# Patient Record
Sex: FEMALE
[De-identification: names, ages, dates, MRNs, and addresses within clinical notes are randomized per-mention and may not be internally consistent; named-entity substitution may affect disease eponyms.]

---

## 2022-11-14 PROBLEM — Z00.00 ENCOUNTER FOR PREVENTIVE HEALTH EXAMINATION: Status: ACTIVE | Noted: 2022-11-14

## 2022-11-17 ENCOUNTER — APPOINTMENT (OUTPATIENT)
Dept: UROLOGY | Facility: CLINIC | Age: 27
End: 2022-11-17

## 2022-11-17 VITALS
OXYGEN SATURATION: 96 % | TEMPERATURE: 97.3 F | WEIGHT: 125 LBS | HEART RATE: 87 BPM | DIASTOLIC BLOOD PRESSURE: 66 MMHG | SYSTOLIC BLOOD PRESSURE: 110 MMHG | HEIGHT: 63 IN | BODY MASS INDEX: 22.15 KG/M2

## 2022-11-17 DIAGNOSIS — N39.0 URINARY TRACT INFECTION, SITE NOT SPECIFIED: ICD-10-CM

## 2022-11-17 PROCEDURE — 99204 OFFICE O/P NEW MOD 45 MIN: CPT | Mod: 25

## 2022-11-17 PROCEDURE — 51798 US URINE CAPACITY MEASURE: CPT

## 2022-11-17 NOTE — ASSESSMENT
[FreeTextEntry1] : 27 year old with recurrent UTIs, usually post-coital in nature. Recurrent UTI is defined as two episodes of acute bacterial cystitis within six months or three episodes within one year. Patient meets criteria. However, episodes often not culture proven and patient initiates antibiotics (leftover from prior), so unclear if inadequately treated, new infections, or if now MDR organisms. Discussed importance of urine cultures in this process. Discussed conservative measures to reduce incidence including increasing water intake and cranberry extract prophylaxis, which patient has recently done. Discussed role of post-coital prophylaxis in this scenario as well. Will first treat this urine culture after culture returns (patient has taken incomplete course of nitrofurantoin, improved but symptoms not resolved), and will then discuss prophylaxis. Additionally, given gross hematuria outside of episode as well as UTIs, will work-up with cystoscopy and renal US.\par \par - UA, urine culture\par - importance of urine cultures if future episodes (discussed can call and drop off specimen at office before initiates any medication)\par - post-coital prophylaxis discussed -- will await cultures\par - given john + hematuria outside of episodes will work up with cystoscopy and renal US

## 2022-11-17 NOTE — PHYSICAL EXAM
[General Appearance - Well Developed] : well developed [General Appearance - Well Nourished] : well nourished [Normal Appearance] : normal appearance [Well Groomed] : well groomed [General Appearance - In No Acute Distress] : no acute distress [Edema] : no peripheral edema [Respiration, Rhythm And Depth] : normal respiratory rhythm and effort [Exaggerated Use Of Accessory Muscles For Inspiration] : no accessory muscle use [Urinary Bladder Findings] : the bladder was normal on palpation [External Female Genitalia] : normal external genitalia [Abdomen Soft] : soft [Abdomen Tenderness] : non-tender [Costovertebral Angle Tenderness] : no ~M costovertebral angle tenderness [Urethral Meatus] : normal urethra [Vagina] : normal vaginal exam [Normal Station and Gait] : the gait and station were normal for the patient's age [] : no rash [No Focal Deficits] : no focal deficits [Oriented To Time, Place, And Person] : oriented to person, place, and time [Affect] : the affect was normal [Mood] : the mood was normal [Not Anxious] : not anxious

## 2022-11-17 NOTE — HISTORY OF PRESENT ILLNESS
[FreeTextEntry1] : Patient Name: Laura Carlos\par YOB: 1995\par Contact Number: [phone]\par ------------------------------------------------------------------------------\par Date of Initial Visit: 11/17/22\par Referring Provider/PCP: none\par ------------------------------------------------------------------------------\par \par CC: UTI\par \par HPI: 27 year old with history of recurrent UTIs. Most recent episode was 3 days ago had sexual relations, about 12 hours after noticed smell followed by burning. Started nitrofurantoin left over from prior x 3 days. Symptoms improved. No urine culture was sent. No fevers or chills. No flank pain or kidney stones.\par \par Patient reports about 1 UTI a month, usually post-coital. This has been going on for 15 years. However, they are not usually culture proven, usually using left-over antibiotics. Symptoms usually dysuria, at times urgency. During episodes and in between empties bladder completely.  No prior pregnancies, no spermicide use. Does report hematuria at times with episodes, and one episode of hematuria a month ago not during UTI that resolved on its own. Never febrile UTI or flank pain. No prior  surgery.\par \par Patient reports drinks at least 64 ounces a day more recently, prior to that one cup. Takes cranberry extract prophylaxis for the past 3 months.\par \par No known complicating factors present: no anatomic or functional abnormality of the urinary tract (e.g., stone disease, diverticulum, neurogenic bladder), not an immunocompromised host, or no known infection with multi-drug resistant (MDR) bacteria, recurrence within two weeks of initial treatment. \par \par Patient does report unprotected sex, recently tested for STIs. Has IUD in place.\par \par PVR 0\par \par PMH: no\par PSH: back surgery for scoliosis\par Family History: mother with breast cancer\par Social: , single, drinks 2 cocktails/day and on weekend maybe 6 cocktails/day, smokes ~5 cigarettes/week, occasional marijuana\par Meds: cranberry extract, caffeine pills prn\par Allergies: Bactrim\par ROS: no fevers. chills, weight loss, as above

## 2022-11-18 ENCOUNTER — TRANSCRIPTION ENCOUNTER (OUTPATIENT)
Age: 27
End: 2022-11-18

## 2022-11-18 RX ORDER — NITROFURANTOIN (MONOHYDRATE/MACROCRYSTALS) 25; 75 MG/1; MG/1
100 CAPSULE ORAL
Qty: 14 | Refills: 1 | Status: ACTIVE | COMMUNITY
Start: 2022-11-18 | End: 1900-01-01

## 2022-11-21 RX ORDER — CEFPODOXIME PROXETIL 100 MG/1
100 TABLET, FILM COATED ORAL
Qty: 14 | Refills: 0 | Status: ACTIVE | COMMUNITY
Start: 2022-11-21 | End: 1900-01-01

## 2022-11-22 ENCOUNTER — NON-APPOINTMENT (OUTPATIENT)
Age: 27
End: 2022-11-22

## 2022-11-22 LAB
APPEARANCE: ABNORMAL
BACTERIA UR CULT: ABNORMAL
BACTERIA: NEGATIVE
BILIRUBIN URINE: NEGATIVE
BLOOD URINE: NEGATIVE
COLOR: YELLOW
GLUCOSE QUALITATIVE U: NEGATIVE
HYALINE CASTS: 0 /LPF
KETONES URINE: NEGATIVE
LEUKOCYTE ESTERASE URINE: NEGATIVE
MICROSCOPIC-UA: NORMAL
NITRITE URINE: NEGATIVE
PH URINE: 6.5
PROTEIN URINE: NORMAL
RED BLOOD CELLS URINE: 1 /HPF
SPECIFIC GRAVITY URINE: 1.02
SQUAMOUS EPITHELIAL CELLS: 12 /HPF
UROBILINOGEN URINE: NORMAL
WHITE BLOOD CELLS URINE: 2 /HPF

## 2022-12-01 ENCOUNTER — APPOINTMENT (OUTPATIENT)
Dept: UROLOGY | Facility: CLINIC | Age: 27
End: 2022-12-01
Payer: COMMERCIAL

## 2022-12-01 VITALS
WEIGHT: 125 LBS | BODY MASS INDEX: 22.15 KG/M2 | HEIGHT: 63 IN | TEMPERATURE: 98.3 F | DIASTOLIC BLOOD PRESSURE: 71 MMHG | HEART RATE: 79 BPM | OXYGEN SATURATION: 98 % | SYSTOLIC BLOOD PRESSURE: 117 MMHG

## 2022-12-01 DIAGNOSIS — Z87.448 PERSONAL HISTORY OF OTHER DISEASES OF URINARY SYSTEM: ICD-10-CM

## 2022-12-01 PROCEDURE — 52000 CYSTOURETHROSCOPY: CPT

## 2022-12-01 PROCEDURE — 99214 OFFICE O/P EST MOD 30 MIN: CPT | Mod: 25

## 2022-12-01 RX ORDER — PHENAZOPYRIDINE 200 MG/1
200 TABLET, FILM COATED ORAL 3 TIMES DAILY
Qty: 6 | Refills: 0 | Status: ACTIVE | COMMUNITY
Start: 2022-12-01 | End: 1900-01-01

## 2022-12-01 NOTE — ASSESSMENT
[FreeTextEntry1] : 27 year old with recurrent UTIs, usually post-coital in nature, and episode of gross hematuria outside of UTIs. Cystoscopy today revealed white sessile lesion medial to left UO. Renal US showed mild fullness left collecting system. Discussed cystoscopy findings in detail and next steps. High likelihood that lesion is inflammatory in nature, but other etiologies need to be ruled out. At a minimum, repeat cystoscopy is needed to ensure resolution versus persistence. If persists, will need to be biopsied. Other option is proceeding directly to operating room for evaluation/biopsy. Patient would like to proceed to OR, and understands that lesion may be resolved at that time. She is comfortable with trip to OR with a negative finding, and prefers that to repeat office cysto and possible office biopsy. Additionally, will further work-up with CTU in context of lesion and fullness of left collecting system.\par \par - UA, urine culture\par - Resend cytology (QNS last visit)\par - CTU\par - plan for OR for cystoscopy +/- bladder biopsy +/- left ureteral stent placement (and any indicated procedures based on CTU) --> patient leaving for holidays and will do after\par - will plan for course of suppressive antibiotics prior to ensure in case inflammatory/infectious in nature 189.5

## 2022-12-01 NOTE — HISTORY OF PRESENT ILLNESS
[FreeTextEntry1] : Patient Name: Laura Carlos\par YOB: 1995\par Contact Number: [phone]\par ------------------------------------------------------------------------------\par Date of Initial Visit: 11/17/22\par Referring Provider/PCP: none\par ------------------------------------------------------------------------------\par Initial HPI:\par \par CC: UTI\par \par HPI: 27 year old with history of recurrent UTIs. Most recent episode was 3 days ago had sexual relations, about 12 hours after noticed smell followed by burning. Started nitrofurantoin left over from prior x 3 days. Symptoms improved. No urine culture was sent. No fevers or chills. No flank pain or kidney stones.\par \par Patient reports about 1 UTI a month, usually post-coital. This has been going on for 15 years. However, they are not usually culture proven, usually using left-over antibiotics. Symptoms usually dysuria, at times urgency. During episodes and in between empties bladder completely. No prior pregnancies, no spermicide use. Does report hematuria at times with episodes, and one episode of hematuria a month ago not during UTI that resolved on its own. Never febrile UTI or flank pain. No prior  surgery.\par \par Patient reports drinks at least 64 ounces a day more recently, prior to that one cup. Takes cranberry extract prophylaxis for the past 3 months.\par \par No known complicating factors present: no anatomic or functional abnormality of the urinary tract (e.g., stone disease, diverticulum, neurogenic bladder), not an immunocompromised host, or no known infection with multi-drug resistant (MDR) bacteria, recurrence within two weeks of initial treatment. \par \par Patient does report unprotected sex, recently tested for STIs. Has IUD in place.\par \par PVR 0\par ------------------------------------------------------------------------------\par \par Interval history:\par \par Urine culture + for e.coli. Switched to cefpodoxime and patient reported complete resolution of symptoms.\par \par Presents today for cystoscopy given history of gross hematuria outside of UTI episodes as well as recurrent UTIs.\par \par Cystoscopy: ~1cm sessile white lesion medial to left ureteral orifice\par Renal bladder US: persistent fullness left collecting system even after voiding, no hydronephrosis bilaterally, no stones or masses visualized.\par \par ------------------------------------------------------------------------------\par PMH: no\par PSH: back surgery for scoliosis\par Family History: mother with breast cancer\par Social: , single, drinks 2 cocktails/day and on weekend maybe 6 cocktails/day, smokes ~5 cigarettes/week, occasional marijuana\par Meds: cranberry extract, caffeine pills prn\par Allergies: Bactrim\par ROS: no fevers. chills, weight loss, as above \par

## 2022-12-02 LAB
APPEARANCE: ABNORMAL
BACTERIA: NEGATIVE
BILIRUBIN URINE: NEGATIVE
BLOOD URINE: NEGATIVE
COLOR: YELLOW
GLUCOSE QUALITATIVE U: NEGATIVE
HYALINE CASTS: 0 /LPF
KETONES URINE: NEGATIVE
LEUKOCYTE ESTERASE URINE: NEGATIVE
MICROSCOPIC-UA: NORMAL
NITRITE URINE: NEGATIVE
PH URINE: 6
PROTEIN URINE: NORMAL
RED BLOOD CELLS URINE: 1 /HPF
SPECIFIC GRAVITY URINE: 1.02
SQUAMOUS EPITHELIAL CELLS: 14 /HPF
UROBILINOGEN URINE: NORMAL
WHITE BLOOD CELLS URINE: 4 /HPF

## 2022-12-03 LAB — BACTERIA UR CULT: NORMAL

## 2022-12-05 LAB — URINE CYTOLOGY: NORMAL

## 2022-12-09 ENCOUNTER — APPOINTMENT (OUTPATIENT)
Dept: CT IMAGING | Facility: CLINIC | Age: 27
End: 2022-12-09

## 2022-12-14 ENCOUNTER — NON-APPOINTMENT (OUTPATIENT)
Age: 27
End: 2022-12-14

## 2023-01-09 ENCOUNTER — APPOINTMENT (OUTPATIENT)
Dept: UROLOGY | Facility: HOSPITAL | Age: 28
End: 2023-01-09

## 2023-01-23 ENCOUNTER — APPOINTMENT (OUTPATIENT)
Dept: UROLOGY | Facility: HOSPITAL | Age: 28
End: 2023-01-23

## 2023-02-08 ENCOUNTER — NON-APPOINTMENT (OUTPATIENT)
Age: 28
End: 2023-02-08

## 2023-02-27 ENCOUNTER — NON-APPOINTMENT (OUTPATIENT)
Age: 28
End: 2023-02-27

## 2023-03-06 ENCOUNTER — NON-APPOINTMENT (OUTPATIENT)
Age: 28
End: 2023-03-06

## 2023-03-20 ENCOUNTER — NON-APPOINTMENT (OUTPATIENT)
Age: 28
End: 2023-03-20

## 2023-04-20 ENCOUNTER — APPOINTMENT (OUTPATIENT)
Dept: UROLOGY | Facility: CLINIC | Age: 28
End: 2023-04-20

## 2023-04-21 ENCOUNTER — NON-APPOINTMENT (OUTPATIENT)
Age: 28
End: 2023-04-21